# Patient Record
Sex: FEMALE | Race: WHITE | URBAN - NONMETROPOLITAN AREA
[De-identification: names, ages, dates, MRNs, and addresses within clinical notes are randomized per-mention and may not be internally consistent; named-entity substitution may affect disease eponyms.]

---

## 2024-01-17 ENCOUNTER — OFFICE VISIT (OUTPATIENT)
Dept: FAMILY MEDICINE CLINIC | Age: 24
End: 2024-01-17
Payer: COMMERCIAL

## 2024-01-17 VITALS
HEIGHT: 64 IN | TEMPERATURE: 97.8 F | OXYGEN SATURATION: 97 % | DIASTOLIC BLOOD PRESSURE: 100 MMHG | SYSTOLIC BLOOD PRESSURE: 140 MMHG | HEART RATE: 97 BPM | BODY MASS INDEX: 43.36 KG/M2 | WEIGHT: 254 LBS

## 2024-01-17 DIAGNOSIS — R09.81 CONGESTION OF NASAL SINUS: ICD-10-CM

## 2024-01-17 DIAGNOSIS — J06.9 VIRAL UPPER RESPIRATORY ILLNESS: Primary | ICD-10-CM

## 2024-01-17 DIAGNOSIS — R09.82 POST-NASAL DRIP: ICD-10-CM

## 2024-01-17 PROCEDURE — 99203 OFFICE O/P NEW LOW 30 MIN: CPT | Performed by: NURSE PRACTITIONER

## 2024-01-17 RX ORDER — LORATADINE 10 MG/1
10 TABLET ORAL DAILY
COMMUNITY

## 2024-01-17 SDOH — ECONOMIC STABILITY: INCOME INSECURITY: HOW HARD IS IT FOR YOU TO PAY FOR THE VERY BASICS LIKE FOOD, HOUSING, MEDICAL CARE, AND HEATING?: NOT HARD AT ALL

## 2024-01-17 SDOH — ECONOMIC STABILITY: FOOD INSECURITY: WITHIN THE PAST 12 MONTHS, THE FOOD YOU BOUGHT JUST DIDN'T LAST AND YOU DIDN'T HAVE MONEY TO GET MORE.: NEVER TRUE

## 2024-01-17 SDOH — ECONOMIC STABILITY: HOUSING INSECURITY
IN THE LAST 12 MONTHS, WAS THERE A TIME WHEN YOU DID NOT HAVE A STEADY PLACE TO SLEEP OR SLEPT IN A SHELTER (INCLUDING NOW)?: NO

## 2024-01-17 SDOH — ECONOMIC STABILITY: FOOD INSECURITY: WITHIN THE PAST 12 MONTHS, YOU WORRIED THAT YOUR FOOD WOULD RUN OUT BEFORE YOU GOT MONEY TO BUY MORE.: NEVER TRUE

## 2024-01-17 ASSESSMENT — ENCOUNTER SYMPTOMS
RHINORRHEA: 1
COUGH: 1

## 2024-01-17 ASSESSMENT — PATIENT HEALTH QUESTIONNAIRE - PHQ9
1. LITTLE INTEREST OR PLEASURE IN DOING THINGS: 0
SUM OF ALL RESPONSES TO PHQ QUESTIONS 1-9: 0
2. FEELING DOWN, DEPRESSED OR HOPELESS: 0
SUM OF ALL RESPONSES TO PHQ QUESTIONS 1-9: 0
SUM OF ALL RESPONSES TO PHQ9 QUESTIONS 1 & 2: 0
SUM OF ALL RESPONSES TO PHQ QUESTIONS 1-9: 0
SUM OF ALL RESPONSES TO PHQ QUESTIONS 1-9: 0

## 2024-01-17 NOTE — PROGRESS NOTES
[facility] department of [address] [address]  Phone: [phone]  Fax: [phone]      Vaishnavi Calderon is a 23 y.o. female who presents to the [facility] clinic today for her medical conditions/complaints as noted below.    Vaishnavi Calderon is c/o of URI (Started Saturday with back hurting, then teeth hurt. Has been having some left-sided congestion, jaw pain, headache, neck pain, and ear pain. (At home Covid test on Monday was negative.))      Assessment and Plan     1. Viral upper respiratory illness  Discussed viral illness with patient and appropriate timing and use of antibiotics. They were encouraged to try symptomatic supportive therapies and list of OTC medications that are safe and effective was provided to them in the after visit summary.     2. Post-nasal drip  Discussed use of claritin or zyrtec and nasal steroid sprays.     3. Congestion of nasal sinus  Discussed use of claritin or zyrtec and nasal steroid sprays.   May also use Tylenol Sinus for sinus HA or ear discomfort as directed.    Recommended over the counter medications/treatments:  The use of an antihistamine (Claritin or Zyrtec) may help with sinus congestion and drainage.   A nasal steroid spray (Flonase or Nasacort) should be used to help decrease swelling and irritation in the sinuses to reduce congestion and drainage.   Mucinex (12 hour) will also help to thin mucus so that it drains easier and improves congestion. Works best if you are drinking plenty of water.  Honey with or without Lemon may also help with coughing.  Alternating hot liquids with cold liquids helps to soothe a sore throat.  Use Acetaminophen (Tylenol) to help relieve fever, chills or body aches. If allowed, you may alternate using ibuprofen (Motrin) and Tylenol. Do not exceed 3,000mg of Tylenol in a 24 hour time period.  Make sure to stay well hydrated by drinking plenty

## 2024-01-17 NOTE — PATIENT INSTRUCTIONS
Recommended over the counter medications/treatments:  The use of an antihistamine (Claritin or Zyrtec) may help with sinus congestion and drainage.   A nasal steroid spray (Flonase or Nasacort) should be used to help decrease swelling and irritation in the sinuses to reduce congestion and drainage.   Mucinex (12 hour) will also help to thin mucus so that it drains easier and improves congestion. Works best if you are drinking plenty of water.  Honey with or without Lemon may also help with coughing.  Alternating hot liquids with cold liquids helps to soothe a sore throat.  Use Acetaminophen (Tylenol) to help relieve fever, chills or body aches. If allowed, you may alternate using ibuprofen (Motrin) and Tylenol. Do not exceed 3,000mg of Tylenol in a 24 hour time period.  Make sure to stay well hydrated by drinking plenty of water.    Follow up with primary care provider in 2 to 3 days or as needed if no improvement or worsening of your symptoms.